# Patient Record
(demographics unavailable — no encounter records)

---

## 2025-05-09 NOTE — HISTORY OF PRESENT ILLNESS
[Postpartum Follow Up] : postpartum follow up [Delivery Date: ___] : on [unfilled] [] : delivered by vaginal delivery [Female] : Delivery History: baby girl [Wt. ___] : weighing [unfilled] [Breastfeeding] : currently nursing [BF with Difficulty] : nursing with difficulty [Intended Contraception] : Intended Contraception: [(0) As much as I always could] : (0) As much as I always could [(0) No, not at all] : (0) No, not at all [None] : No associated symptoms are reported [Doing Well] : is doing well [No Sign of Infection] : is showing no signs of infection [Excellent Pain Control] : has excellent pain control [FreeTextEntry1] : 20F  s/p  25 at full term without complication here for postpartum visit. Pt reports spotting, improving. Denies fever, chills, n/v, vaginal discharge.  [Complications:___] : no complications [Abdominal Pain] : no abdominal pain [Back Pain] : no back pain [Breast Pain] : no breast pain [BreastFeeding Problems] : no breastfeeding problems [Chest Pain] : no chest pain [Cracked Nipples] : no cracked nipples [S/Sx PP Depression] : no signs/symptoms of postpartum depression [Irregular Bleeding] : no irregular bleeding [Leg Pain] : no leg pain [Shortness of Breath] : no shortness of breath [Suicidal Ideation] : no suicidal ideation [Vaginal Discharge] : no vaginal discharge [Fatigue] : no fatigue [Dysuria] : no dysuria [Fever] : no fever [Headache] : no headache [Nausea] : no nausea [Vomiting] : no vomiting [FreeTextEntry8] : 2 week postpartum check [de-identified] : -20F  s/p  on 25 at full term here for 2 weeks postpartum check up -reports improving spotting, denies sign/symptoms of infection -no issues with breastfeeding -no sign/symptoms of postpartum blues/depression -prenatal testing concerning for Turners, mother declined amniocentesis for confirmatory study, now pending karyotype  -pt prefers Depo as contraceptive measure, RTC in a month to initiate depo, prescription sent to pharmacy  -RTC in one month for 6wk postpartum exam at which [facility] can be closed   Discussed case with Dr. Mancera

## 2025-05-09 NOTE — HISTORY OF PRESENT ILLNESS
[Postpartum Follow Up] : postpartum follow up [Delivery Date: ___] : on [unfilled] [] : delivered by vaginal delivery [Female] : Delivery History: baby girl [Wt. ___] : weighing [unfilled] [Breastfeeding] : currently nursing [BF with Difficulty] : nursing with difficulty [Intended Contraception] : Intended Contraception: [(0) As much as I always could] : (0) As much as I always could [(0) No, not at all] : (0) No, not at all [None] : No associated symptoms are reported [Doing Well] : is doing well [No Sign of Infection] : is showing no signs of infection [Excellent Pain Control] : has excellent pain control [FreeTextEntry1] : 20F  s/p  25 at full term without complication here for postpartum visit. Pt reports spotting, improving. Denies fever, chills, n/v, vaginal discharge.  [Complications:___] : no complications [Abdominal Pain] : no abdominal pain [Back Pain] : no back pain [Breast Pain] : no breast pain [BreastFeeding Problems] : no breastfeeding problems [Chest Pain] : no chest pain [Cracked Nipples] : no cracked nipples [S/Sx PP Depression] : no signs/symptoms of postpartum depression [Irregular Bleeding] : no irregular bleeding [Leg Pain] : no leg pain [Shortness of Breath] : no shortness of breath [Suicidal Ideation] : no suicidal ideation [Vaginal Discharge] : no vaginal discharge [Fatigue] : no fatigue [Dysuria] : no dysuria [Fever] : no fever [Headache] : no headache [Nausea] : no nausea [Vomiting] : no vomiting [FreeTextEntry8] : 2 week postpartum check [de-identified] : -20F  s/p  on 25 at full term here for 2 weeks postpartum check up -reports improving spotting, denies sign/symptoms of infection -no issues with breastfeeding -no sign/symptoms of postpartum blues/depression -prenatal testing concerning for Turners, mother declined amniocentesis for confirmatory study, now pending karyotype  -pt prefers Depo as contraceptive measure, RTC in a month to initiate depo, prescription sent to pharmacy  -RTC in one month for 6wk postpartum exam at which [facility] can be closed   Discussed case with Dr. Mancera

## 2025-05-09 NOTE — HISTORY OF PRESENT ILLNESS
[Postpartum Follow Up] : postpartum follow up [Delivery Date: ___] : on [unfilled] [] : delivered by vaginal delivery [Female] : Delivery History: baby girl [Wt. ___] : weighing [unfilled] [Breastfeeding] : currently nursing [BF with Difficulty] : nursing with difficulty [Intended Contraception] : Intended Contraception: [(0) As much as I always could] : (0) As much as I always could [(0) No, not at all] : (0) No, not at all [None] : No associated symptoms are reported [Doing Well] : is doing well [No Sign of Infection] : is showing no signs of infection [Excellent Pain Control] : has excellent pain control [FreeTextEntry1] : 20F  s/p  25 at full term without complication here for postpartum visit. Pt reports spotting, improving. Denies fever, chills, n/v, vaginal discharge.  [Complications:___] : no complications [Abdominal Pain] : no abdominal pain [Back Pain] : no back pain [Breast Pain] : no breast pain [BreastFeeding Problems] : no breastfeeding problems [Chest Pain] : no chest pain [Cracked Nipples] : no cracked nipples [S/Sx PP Depression] : no signs/symptoms of postpartum depression [Irregular Bleeding] : no irregular bleeding [Leg Pain] : no leg pain [Shortness of Breath] : no shortness of breath [Suicidal Ideation] : no suicidal ideation [Vaginal Discharge] : no vaginal discharge [Fatigue] : no fatigue [Dysuria] : no dysuria [Fever] : no fever [Headache] : no headache [Nausea] : no nausea [Vomiting] : no vomiting [FreeTextEntry8] : 2 week postpartum check [de-identified] : -20F  s/p  on 25 at full term here for 2 weeks postpartum check up -reports improving spotting, denies sign/symptoms of infection -no issues with breastfeeding -no sign/symptoms of postpartum blues/depression -prenatal testing concerning for Turners, mother declined amniocentesis for confirmatory study, now pending karyotype  -pt prefers Depo as contraceptive measure, RTC in a month to initiate depo, prescription sent to pharmacy  -RTC in one month for 6wk postpartum exam at which [facility] can be closed   Discussed case with Dr. Mancera

## 2025-06-25 NOTE — HISTORY OF PRESENT ILLNESS
[FreeTextEntry1] : f/up [de-identified] : Pt is 21 yo F s/p  25 presenting for Depo-Provera injection. Urine pregnancy test negative. Depo-Provera injection consent form signed.  On arrival patient BP noted to be, 89/49 repeat 80/49 and then 104/67 after receiving cups of water. No reported symptoms.

## 2025-06-25 NOTE — HISTORY OF PRESENT ILLNESS
[FreeTextEntry1] : f/up [de-identified] : Pt is 19 yo F s/p  25 presenting for Depo-Provera injection. Urine pregnancy test negative. Depo-Provera injection consent form signed.  On arrival patient BP noted to be, 89/49 repeat 80/49 and then 104/67 after receiving cups of water. No reported symptoms.